# Patient Record
Sex: FEMALE | Race: WHITE | RURAL
[De-identification: names, ages, dates, MRNs, and addresses within clinical notes are randomized per-mention and may not be internally consistent; named-entity substitution may affect disease eponyms.]

---

## 2017-07-05 ENCOUNTER — ON CAMPUS - OUTPATIENT (AMBULATORY)
Dept: RURAL HOSPITAL 3 | Facility: HOSPITAL | Age: 67
End: 2017-07-05
Payer: MEDICARE

## 2017-07-05 DIAGNOSIS — Z12.11 ENCOUNTER FOR SCREENING FOR MALIGNANT NEOPLASM OF COLON: ICD-10-CM

## 2017-07-05 DIAGNOSIS — K64.8 OTHER HEMORRHOIDS: ICD-10-CM

## 2017-07-05 PROCEDURE — G0121 COLON CA SCRN NOT HI RSK IND: HCPCS | Performed by: INTERNAL MEDICINE

## 2018-07-24 ENCOUNTER — OFFICE (AMBULATORY)
Dept: URBAN - METROPOLITAN AREA CLINIC 64 | Facility: CLINIC | Age: 68
End: 2018-07-24

## 2018-07-24 VITALS
DIASTOLIC BLOOD PRESSURE: 84 MMHG | WEIGHT: 172 LBS | HEART RATE: 84 BPM | HEIGHT: 64 IN | SYSTOLIC BLOOD PRESSURE: 118 MMHG

## 2018-07-24 DIAGNOSIS — K62.89 OTHER SPECIFIED DISEASES OF ANUS AND RECTUM: ICD-10-CM

## 2018-07-24 DIAGNOSIS — K64.4 RESIDUAL HEMORRHOIDAL SKIN TAGS: ICD-10-CM

## 2018-07-24 DIAGNOSIS — K59.00 CONSTIPATION, UNSPECIFIED: ICD-10-CM

## 2018-07-24 DIAGNOSIS — K21.9 GASTRO-ESOPHAGEAL REFLUX DISEASE WITHOUT ESOPHAGITIS: ICD-10-CM

## 2018-07-24 DIAGNOSIS — R15.2 FECAL URGENCY: ICD-10-CM

## 2018-07-24 DIAGNOSIS — R19.5 OTHER FECAL ABNORMALITIES: ICD-10-CM

## 2018-07-24 DIAGNOSIS — K62.5 HEMORRHAGE OF ANUS AND RECTUM: ICD-10-CM

## 2018-07-24 DIAGNOSIS — R10.30 LOWER ABDOMINAL PAIN, UNSPECIFIED: ICD-10-CM

## 2018-07-24 PROCEDURE — 99214 OFFICE O/P EST MOD 30 MIN: CPT | Performed by: NURSE PRACTITIONER

## 2018-07-24 RX ORDER — DICYCLOMINE HYDROCHLORIDE 20 MG/1
40 TABLET ORAL
Qty: 60 | Refills: 11 | Status: ACTIVE
Start: 2018-07-24

## 2018-07-24 RX ORDER — HYDROCORTISONE 25 MG/G
OINTMENT TOPICAL
Qty: 30 | Refills: 6 | Status: ACTIVE
Start: 2018-07-24

## 2018-07-24 RX ORDER — SORBITOL SOLUTION 70 %
SOLUTION, ORAL MISCELLANEOUS
Qty: 100 | Refills: 0 | Status: ACTIVE
Start: 2018-07-24

## 2018-07-24 RX ORDER — LIDOCAINE 50 MG/G
CREAM TOPICAL
Qty: 30 | Refills: 1 | Status: ACTIVE
Start: 2018-07-24

## 2019-11-05 ENCOUNTER — IMPORTED ENCOUNTER (OUTPATIENT)
Dept: URBAN - METROPOLITAN AREA CLINIC 50 | Facility: CLINIC | Age: 69
End: 2019-11-05

## 2020-04-10 ENCOUNTER — HOSPITAL ENCOUNTER (OUTPATIENT)
Facility: HOSPITAL | Age: 70
Setting detail: OBSERVATION
Discharge: HOME | End: 2020-04-11
Attending: EMERGENCY MEDICINE | Admitting: HOSPITALIST

## 2020-04-10 ENCOUNTER — APPOINTMENT (OUTPATIENT)
Dept: CT IMAGING | Facility: HOSPITAL | Age: 70
End: 2020-04-10

## 2020-04-10 DIAGNOSIS — R41.0 CONFUSION: Primary | ICD-10-CM

## 2020-04-10 PROBLEM — E66.9 OBESITY (BMI 30-39.9): Status: ACTIVE | Noted: 2020-04-10

## 2020-04-10 PROBLEM — N17.9 AKI (ACUTE KIDNEY INJURY) (HCC): Status: ACTIVE | Noted: 2020-04-10

## 2020-04-10 PROBLEM — F41.9 ANXIETY DISORDER: Status: ACTIVE | Noted: 2020-04-10

## 2020-04-10 PROBLEM — E87.6 HYPOKALEMIA: Status: ACTIVE | Noted: 2020-04-10

## 2020-04-10 LAB
ANION GAP SERPL CALCULATED.3IONS-SCNC: 14 MMOL/L (ref 5–15)
APTT PPP: 22.7 SECONDS (ref 24–31)
BACTERIA UR QL AUTO: ABNORMAL /HPF
BASOPHILS # BLD AUTO: 0 10*3/MM3 (ref 0–0.2)
BASOPHILS NFR BLD AUTO: 0.7 % (ref 0–1.5)
BILIRUB UR QL STRIP: NEGATIVE
BUN BLD-MCNC: 17 MG/DL (ref 8–23)
BUN/CREAT SERPL: 13.4 (ref 7–25)
CALCIUM SPEC-SCNC: 9.8 MG/DL (ref 8.6–10.5)
CHLORIDE SERPL-SCNC: 96 MMOL/L (ref 98–107)
CLARITY UR: CLEAR
CO2 SERPL-SCNC: 29 MMOL/L (ref 22–29)
COLOR UR: YELLOW
CREAT BLD-MCNC: 1.27 MG/DL (ref 0.57–1)
DEPRECATED RDW RBC AUTO: 42.4 FL (ref 37–54)
EOSINOPHIL # BLD AUTO: 0.1 10*3/MM3 (ref 0–0.4)
EOSINOPHIL NFR BLD AUTO: 2.1 % (ref 0.3–6.2)
ERYTHROCYTE [DISTWIDTH] IN BLOOD BY AUTOMATED COUNT: 14 % (ref 12.3–15.4)
GFR SERPL CREATININE-BSD FRML MDRD: 42 ML/MIN/1.73
GLUCOSE BLD-MCNC: 116 MG/DL (ref 65–99)
GLUCOSE UR STRIP-MCNC: NEGATIVE MG/DL
HCT VFR BLD AUTO: 41.2 % (ref 34–46.6)
HGB BLD-MCNC: 14.8 G/DL (ref 12–15.9)
HGB UR QL STRIP.AUTO: NEGATIVE
HOLD SPECIMEN: NORMAL
HOLD SPECIMEN: NORMAL
HYALINE CASTS UR QL AUTO: ABNORMAL /LPF
INR PPP: 0.95 (ref 0.9–1.1)
KETONES UR QL STRIP: NEGATIVE
LEUKOCYTE ESTERASE UR QL STRIP.AUTO: ABNORMAL
LYMPHOCYTES # BLD AUTO: 1.6 10*3/MM3 (ref 0.7–3.1)
LYMPHOCYTES NFR BLD AUTO: 22.2 % (ref 19.6–45.3)
MCH RBC QN AUTO: 31.3 PG (ref 26.6–33)
MCHC RBC AUTO-ENTMCNC: 35.9 G/DL (ref 31.5–35.7)
MCV RBC AUTO: 87.1 FL (ref 79–97)
MONOCYTES # BLD AUTO: 0.7 10*3/MM3 (ref 0.1–0.9)
MONOCYTES NFR BLD AUTO: 10.5 % (ref 5–12)
NEUTROPHILS # BLD AUTO: 4.5 10*3/MM3 (ref 1.7–7)
NEUTROPHILS NFR BLD AUTO: 64.5 % (ref 42.7–76)
NITRITE UR QL STRIP: NEGATIVE
NRBC BLD AUTO-RTO: 0.1 /100 WBC (ref 0–0.2)
PH UR STRIP.AUTO: 6 [PH] (ref 5–8)
PLATELET # BLD AUTO: 207 10*3/MM3 (ref 140–450)
PMV BLD AUTO: 10.6 FL (ref 6–12)
POTASSIUM BLD-SCNC: 3 MMOL/L (ref 3.5–5.2)
POTASSIUM BLD-SCNC: 3.1 MMOL/L (ref 3.5–5.2)
PROT UR QL STRIP: NEGATIVE
PROTHROMBIN TIME: 10 SECONDS (ref 9.6–11.7)
RBC # BLD AUTO: 4.73 10*6/MM3 (ref 3.77–5.28)
RBC # UR: ABNORMAL /HPF
REF LAB TEST METHOD: ABNORMAL
SODIUM BLD-SCNC: 139 MMOL/L (ref 136–145)
SP GR UR STRIP: 1.01 (ref 1–1.03)
SQUAMOUS #/AREA URNS HPF: ABNORMAL /HPF
TROPONIN T SERPL-MCNC: <0.01 NG/ML (ref 0–0.03)
UROBILINOGEN UR QL STRIP: ABNORMAL
WBC NRBC COR # BLD: 7 10*3/MM3 (ref 3.4–10.8)
WBC UR QL AUTO: ABNORMAL /HPF
WHOLE BLOOD HOLD SPECIMEN: NORMAL
WHOLE BLOOD HOLD SPECIMEN: NORMAL

## 2020-04-10 PROCEDURE — 99219 PR INITIAL OBSERVATION CARE/DAY 50 MINUTES: CPT | Performed by: PHYSICIAN ASSISTANT

## 2020-04-10 PROCEDURE — 70450 CT HEAD/BRAIN W/O DYE: CPT

## 2020-04-10 PROCEDURE — G0378 HOSPITAL OBSERVATION PER HR: HCPCS

## 2020-04-10 PROCEDURE — 96372 THER/PROPH/DIAG INJ SC/IM: CPT

## 2020-04-10 PROCEDURE — 85610 PROTHROMBIN TIME: CPT | Performed by: EMERGENCY MEDICINE

## 2020-04-10 PROCEDURE — 85025 COMPLETE CBC W/AUTO DIFF WBC: CPT | Performed by: EMERGENCY MEDICINE

## 2020-04-10 PROCEDURE — 84132 ASSAY OF SERUM POTASSIUM: CPT | Performed by: PHYSICIAN ASSISTANT

## 2020-04-10 PROCEDURE — 25010000002 ENOXAPARIN PER 10 MG: Performed by: PHYSICIAN ASSISTANT

## 2020-04-10 PROCEDURE — 81001 URINALYSIS AUTO W/SCOPE: CPT | Performed by: EMERGENCY MEDICINE

## 2020-04-10 PROCEDURE — 84484 ASSAY OF TROPONIN QUANT: CPT | Performed by: EMERGENCY MEDICINE

## 2020-04-10 PROCEDURE — 85730 THROMBOPLASTIN TIME PARTIAL: CPT | Performed by: EMERGENCY MEDICINE

## 2020-04-10 PROCEDURE — 99285 EMERGENCY DEPT VISIT HI MDM: CPT

## 2020-04-10 PROCEDURE — 80048 BASIC METABOLIC PNL TOTAL CA: CPT | Performed by: EMERGENCY MEDICINE

## 2020-04-10 PROCEDURE — 93005 ELECTROCARDIOGRAM TRACING: CPT | Performed by: EMERGENCY MEDICINE

## 2020-04-10 RX ORDER — FAMOTIDINE 20 MG/1
20 TABLET, FILM COATED ORAL EVERY MORNING
COMMUNITY
End: 2022-06-04

## 2020-04-10 RX ORDER — ACETAMINOPHEN 325 MG/1
650 TABLET ORAL EVERY 4 HOURS PRN
Status: DISCONTINUED | OUTPATIENT
Start: 2020-04-10 | End: 2020-04-11 | Stop reason: HOSPADM

## 2020-04-10 RX ORDER — ALUMINA, MAGNESIA, AND SIMETHICONE 2400; 2400; 240 MG/30ML; MG/30ML; MG/30ML
15 SUSPENSION ORAL EVERY 6 HOURS PRN
Status: DISCONTINUED | OUTPATIENT
Start: 2020-04-10 | End: 2020-04-11 | Stop reason: HOSPADM

## 2020-04-10 RX ORDER — SODIUM CHLORIDE 0.9 % (FLUSH) 0.9 %
10 SYRINGE (ML) INJECTION AS NEEDED
Status: DISCONTINUED | OUTPATIENT
Start: 2020-04-10 | End: 2020-04-11 | Stop reason: HOSPADM

## 2020-04-10 RX ORDER — CHOLECALCIFEROL (VITAMIN D3) 125 MCG
5 CAPSULE ORAL NIGHTLY PRN
Status: DISCONTINUED | OUTPATIENT
Start: 2020-04-10 | End: 2020-04-11 | Stop reason: HOSPADM

## 2020-04-10 RX ORDER — ASPIRIN 300 MG/1
300 SUPPOSITORY RECTAL DAILY
Status: DISCONTINUED | OUTPATIENT
Start: 2020-04-11 | End: 2020-04-11 | Stop reason: HOSPADM

## 2020-04-10 RX ORDER — MAGNESIUM SULFATE HEPTAHYDRATE 40 MG/ML
2 INJECTION, SOLUTION INTRAVENOUS AS NEEDED
Status: DISCONTINUED | OUTPATIENT
Start: 2020-04-10 | End: 2020-04-11 | Stop reason: HOSPADM

## 2020-04-10 RX ORDER — AMLODIPINE BESYLATE 5 MG/1
10 TABLET ORAL DAILY
Status: DISCONTINUED | OUTPATIENT
Start: 2020-04-11 | End: 2020-04-11 | Stop reason: HOSPADM

## 2020-04-10 RX ORDER — CALCIUM GLUCONATE 20 MG/ML
1 INJECTION, SOLUTION INTRAVENOUS AS NEEDED
Status: DISCONTINUED | OUTPATIENT
Start: 2020-04-10 | End: 2020-04-11 | Stop reason: HOSPADM

## 2020-04-10 RX ORDER — VENLAFAXINE HYDROCHLORIDE 75 MG/1
75 CAPSULE, EXTENDED RELEASE ORAL DAILY
Status: DISCONTINUED | OUTPATIENT
Start: 2020-04-11 | End: 2020-04-11 | Stop reason: HOSPADM

## 2020-04-10 RX ORDER — CALCIUM GLUCONATE 20 MG/ML
2 INJECTION, SOLUTION INTRAVENOUS AS NEEDED
Status: DISCONTINUED | OUTPATIENT
Start: 2020-04-10 | End: 2020-04-11 | Stop reason: HOSPADM

## 2020-04-10 RX ORDER — CALCIUM POLYCARBOPHIL 625 MG
625 TABLET ORAL DAILY
Status: DISCONTINUED | OUTPATIENT
Start: 2020-04-11 | End: 2020-04-11 | Stop reason: HOSPADM

## 2020-04-10 RX ORDER — VENLAFAXINE HYDROCHLORIDE 75 MG/1
75 CAPSULE, EXTENDED RELEASE ORAL EVERY MORNING
COMMUNITY
Start: 2016-11-01

## 2020-04-10 RX ORDER — DICYCLOMINE HYDROCHLORIDE 10 MG/1
10 CAPSULE ORAL DAILY
Status: DISCONTINUED | OUTPATIENT
Start: 2020-04-11 | End: 2020-04-11 | Stop reason: HOSPADM

## 2020-04-10 RX ORDER — POTASSIUM CHLORIDE 20 MEQ/1
40 TABLET, EXTENDED RELEASE ORAL DAILY
Status: DISCONTINUED | OUTPATIENT
Start: 2020-04-10 | End: 2020-04-11 | Stop reason: HOSPADM

## 2020-04-10 RX ORDER — DICYCLOMINE HYDROCHLORIDE 10 MG/1
10 CAPSULE ORAL EVERY MORNING
COMMUNITY
End: 2022-06-04

## 2020-04-10 RX ORDER — CALCIUM POLYCARBOPHIL 625 MG 625 MG/1
625 TABLET ORAL DAILY
COMMUNITY

## 2020-04-10 RX ORDER — MAGNESIUM SULFATE HEPTAHYDRATE 40 MG/ML
4 INJECTION, SOLUTION INTRAVENOUS AS NEEDED
Status: DISCONTINUED | OUTPATIENT
Start: 2020-04-10 | End: 2020-04-11 | Stop reason: HOSPADM

## 2020-04-10 RX ORDER — ONDANSETRON 4 MG/1
4 TABLET, FILM COATED ORAL EVERY 6 HOURS PRN
Status: DISCONTINUED | OUTPATIENT
Start: 2020-04-10 | End: 2020-04-11 | Stop reason: HOSPADM

## 2020-04-10 RX ORDER — ATORVASTATIN CALCIUM 20 MG/1
20 TABLET, FILM COATED ORAL DAILY
Status: DISCONTINUED | OUTPATIENT
Start: 2020-04-11 | End: 2020-04-11

## 2020-04-10 RX ORDER — OMEGA-3S/DHA/EPA/FISH OIL/D3 300MG-1000
400 CAPSULE ORAL DAILY
COMMUNITY

## 2020-04-10 RX ORDER — ASPIRIN 81 MG/1
81 TABLET, CHEWABLE ORAL DAILY
Status: DISCONTINUED | OUTPATIENT
Start: 2020-04-11 | End: 2020-04-11 | Stop reason: HOSPADM

## 2020-04-10 RX ORDER — POTASSIUM CHLORIDE 1.5 G/1.77G
40 POWDER, FOR SOLUTION ORAL AS NEEDED
Status: DISCONTINUED | OUTPATIENT
Start: 2020-04-10 | End: 2020-04-11 | Stop reason: HOSPADM

## 2020-04-10 RX ORDER — SIMVASTATIN 40 MG
40 TABLET ORAL EVERY MORNING
COMMUNITY
Start: 2017-10-18 | End: 2022-06-04

## 2020-04-10 RX ORDER — MELOXICAM 7.5 MG/1
TABLET ORAL
COMMUNITY
Start: 2016-11-01 | End: 2020-04-10

## 2020-04-10 RX ORDER — HYDRALAZINE HYDROCHLORIDE 20 MG/ML
10 INJECTION INTRAMUSCULAR; INTRAVENOUS EVERY 6 HOURS PRN
Status: DISCONTINUED | OUTPATIENT
Start: 2020-04-10 | End: 2020-04-11 | Stop reason: HOSPADM

## 2020-04-10 RX ORDER — TRIAMTERENE AND HYDROCHLOROTHIAZIDE 37.5; 25 MG/1; MG/1
1 TABLET ORAL DAILY
COMMUNITY
Start: 2018-10-30 | End: 2022-06-04

## 2020-04-10 RX ORDER — SODIUM CHLORIDE 0.9 % (FLUSH) 0.9 %
10 SYRINGE (ML) INJECTION EVERY 12 HOURS SCHEDULED
Status: DISCONTINUED | OUTPATIENT
Start: 2020-04-10 | End: 2020-04-11 | Stop reason: HOSPADM

## 2020-04-10 RX ORDER — AMLODIPINE BESYLATE 10 MG/1
TABLET ORAL
COMMUNITY
Start: 2016-11-01

## 2020-04-10 RX ORDER — DOCUSATE SODIUM 100 MG/1
100 CAPSULE, LIQUID FILLED ORAL 2 TIMES DAILY PRN
Status: DISCONTINUED | OUTPATIENT
Start: 2020-04-10 | End: 2020-04-11 | Stop reason: HOSPADM

## 2020-04-10 RX ORDER — POTASSIUM CHLORIDE 20 MEQ/1
40 TABLET, EXTENDED RELEASE ORAL AS NEEDED
Status: DISCONTINUED | OUTPATIENT
Start: 2020-04-10 | End: 2020-04-11 | Stop reason: HOSPADM

## 2020-04-10 RX ORDER — NITROGLYCERIN 0.4 MG/1
0.4 TABLET SUBLINGUAL
Status: DISCONTINUED | OUTPATIENT
Start: 2020-04-10 | End: 2020-04-11 | Stop reason: HOSPADM

## 2020-04-10 RX ORDER — TRAMADOL HYDROCHLORIDE 50 MG/1
TABLET ORAL
COMMUNITY
Start: 2016-11-01 | End: 2020-04-10

## 2020-04-10 RX ORDER — FAMOTIDINE 20 MG/1
20 TABLET, FILM COATED ORAL
Status: DISCONTINUED | OUTPATIENT
Start: 2020-04-11 | End: 2020-04-11 | Stop reason: HOSPADM

## 2020-04-10 RX ORDER — ACETAMINOPHEN 160 MG/5ML
650 SOLUTION ORAL EVERY 4 HOURS PRN
Status: DISCONTINUED | OUTPATIENT
Start: 2020-04-10 | End: 2020-04-11 | Stop reason: HOSPADM

## 2020-04-10 RX ORDER — ONDANSETRON 2 MG/ML
4 INJECTION INTRAMUSCULAR; INTRAVENOUS EVERY 6 HOURS PRN
Status: DISCONTINUED | OUTPATIENT
Start: 2020-04-10 | End: 2020-04-11 | Stop reason: HOSPADM

## 2020-04-10 RX ORDER — MELATONIN
500 DAILY
Status: DISCONTINUED | OUTPATIENT
Start: 2020-04-11 | End: 2020-04-11 | Stop reason: HOSPADM

## 2020-04-10 RX ORDER — ACETAMINOPHEN 650 MG/1
650 SUPPOSITORY RECTAL EVERY 4 HOURS PRN
Status: DISCONTINUED | OUTPATIENT
Start: 2020-04-10 | End: 2020-04-11 | Stop reason: HOSPADM

## 2020-04-10 RX ADMIN — ENOXAPARIN SODIUM 40 MG: 40 INJECTION SUBCUTANEOUS at 21:41

## 2020-04-10 RX ADMIN — POTASSIUM CHLORIDE 40 MEQ: 1500 TABLET, EXTENDED RELEASE ORAL at 17:55

## 2020-04-10 RX ADMIN — METOPROLOL TARTRATE 25 MG: 25 TABLET, FILM COATED ORAL at 21:41

## 2020-04-10 NOTE — H&P
"      [facility] Medicine Services      Patient Name: Christen Shell  : 1950  MRN: [id number]  Primary Care Physician: Lake Mchugh IV, MD  Date of admission: 4/10/2020    Patient Care Team:  Lake Mchugh IV, MD as PCP - General (Internal Medicine)          Subjective   History Present Illness     Chief Complaint:   Chief Complaint   Patient presents with   • Syncope       Ms. Shell is a 69 y.o. female with past medical history of anxiety, GERD, hypertension, hyperlipidemia and obesity who presents to [facility] complaining of an acute episode of confusion.  Patient states she recalls a phone conversation with her daughter around 1400 hrs. in which she was experiencing no symptoms.  She states she has no recollection of events after that point for the next 2 hours while her  was outside mowing the lawn.  Her  noted that she walked onto the deck and seemed confused with a \"blank stare\" and asked him to come inside.  He contacted her daughter due to patient's unusual behavior who found patient standing in closet and reports the patient was discussing things about the closet but did not seem to be completely oriented or interacting appropriately.  Family called 911 and patient's daughter assisted patient to get ready removed her jewelry and earrings which patient does not recall.  She states the first thing she remembers after all of these events was EMS arriving and telling her that she needed to be seen at the hospital.  With the symptoms she denies any focal neurologic deficits, no falls, trauma, pain, palpitations or irregular heartbeat are reported.  She denies any fever, shortness of air, cough, nausea, vomiting or diarrhea and reports no known sick contacts.  Patient does note one episode of left-sided paresthesias described as \"pins-and-needles in my left arm and leg\" as well as a headache which lasted approximately 10 minutes 3 months prior and resolve " spontaneously.  Patient did not seek medical attention at the time of this event.    In the ED patient found to have labs significant for potassium: 3.1, creatinine: 1.27, remainder of CBC and BMP generally unremarkable.  Troponin less than 0.010.  Sterile pyuria noted on UA with 3-5 WBCs, 6-12 RBCs and 1+ leukocyte esterase and no bacteria or squamous epithelial cells reported.  CT of head reported as showing no acute intracranial abnormality specifically no evidence of hemorrhage, mass-effect or midline shift.  EKG shows sinus rhythm at 85 without acute ST changes or ectopy and a QTC of 471 ms.         History of Present Illness    Review of Systems   Constitution: Negative.   HENT: Negative.    Eyes: Negative.    Cardiovascular: Negative.    Respiratory: Negative.    Endocrine: Negative.    Hematologic/Lymphatic: Negative.    Skin: Negative.    Musculoskeletal: Negative.    Gastrointestinal: Negative.    Genitourinary: Negative.    Neurological: Positive for dizziness and paresthesias.        1 episode of Dizziness and L sided parasthesias in Jan lasting approx 10 min resolving spontaneousl   Psychiatric/Behavioral: Positive for altered mental status.   Allergic/Immunologic: Negative.            Personal History     Past Medical History:   Past Medical History:   Diagnosis Date   • GERD (gastroesophageal reflux disease)    • Hyperlipidemia    • Hypertension        Surgical History:      Past Surgical History:   Procedure Laterality Date   • HYSTERECTOMY             Family History: family history includes Coronary artery disease in her maternal grandfather; Lung cancer in her mother; Pancreatic cancer in her father. Otherwise pertinent FHx was reviewed and unremarkable.     Social History:  reports that she has never smoked. She has never used smokeless tobacco. She reports that she does not use drugs.      Medications:  Prior to Admission medications    Medication Sig Start Date End Date Taking? Authorizing  Provider   amLODIPine (NORVASC) 10 MG tablet TAKE 1 TABLET BY MOUTH  DAILY 11/1/16  Yes Kamari Werner MD   calcium polycarbophil (FIBERCON) 625 MG tablet Take 625 mg by mouth Daily.   Yes ProviderKamari MD   cholecalciferol (VITAMIN D3) 10 MCG (400 UNIT) tablet Take 400 Units by mouth Daily.   Yes Kamari Werner MD   dicyclomine (BENTYL) 10 MG capsule Take 10 mg by mouth Every Morning.   Yes ProviderKamari MD   famotidine (PEPCID) 20 MG tablet Take 20 mg by mouth Every Morning.   Yes Provider, MD Kamari   metoprolol tartrate (LOPRESSOR) 25 MG tablet TAKE ONE TABLET BY MOUTH TWICE DAILY 8/29/18  Yes Kamari Werner MD   simvastatin (ZOCOR) 40 MG tablet Take 40 mg by mouth Every Morning. 10/18/17  Yes Kamari Werner MD   triamterene-hydrochlorothiazide (MAXZIDE-25) 37.5-25 MG per tablet Take 1 tablet by mouth Daily. 10/30/18  Yes Kamari Werner MD   venlafaxine XR (EFFEXOR-XR) 75 MG 24 hr capsule Take 75 mg by mouth Every Morning. 11/1/16  Yes Kamari Werner MD   meloxicam (MOBIC) 7.5 MG tablet MELOXICAM 7.5 MG TABS 11/1/16 4/10/20 Yes Kamari Werner MD   traMADol (ULTRAM) 50 MG tablet TRAMADOL HCL 50 MG TABS 11/1/16 4/10/20 Yes ProviderKamari MD       Allergies:  No Known Allergies    Objective   Objective     Vital Signs  Temp:  [98 °F (36.7 °C)-98.6 °F (37 °C)] 98.6 °F (37 °C)  Heart Rate:  [70-88] 70  Resp:  [15-21] 21  BP: (104-145)/(82-94) 145/93  SpO2:  [96 %-98 %] 96 %  on   ;   Device (Oxygen Therapy): room air  Body mass index is 30.05 kg/m².    Physical Exam   Constitutional: She is oriented to person, place, and time. She appears well-developed and well-nourished. No distress.   HENT:   Head: Normocephalic and atraumatic.   Right Ear: External ear normal.   Left Ear: External ear normal.   Nose: Nose normal.   Eyes: Conjunctivae and EOM are normal.   Neck: Normal range of motion. No JVD present.   Cardiovascular: Normal rate,  regular rhythm, normal heart sounds and intact distal pulses.   No bruits appreciated on exam   Pulmonary/Chest: Effort normal and breath sounds normal.   Abdominal: Soft. Bowel sounds are normal.   Musculoskeletal: Normal range of motion.   Neurological: She is alert and oriented to person, place, and time.   Skin: Skin is warm and dry.   Psychiatric: She has a normal mood and affect. Her behavior is normal. Judgment and thought content normal.   Vitals reviewed.      Results Review:  I have personally reviewed most recent cardiac tracings, lab results and radiology images and interpretations and agree with findings, most notably: Potassium, creatinine, CT of head and EKG.    Results from last 7 days   Lab Units 04/10/20  1622   WBC 10*3/mm3 7.00   HEMOGLOBIN g/dL 14.8   HEMATOCRIT % 41.2   PLATELETS 10*3/mm3 207   INR  0.95     Results from last 7 days   Lab Units 04/10/20  1622   SODIUM mmol/L 139   POTASSIUM mmol/L 3.1*   CHLORIDE mmol/L 96*   CO2 mmol/L 29.0   BUN mg/dL 17   CREATININE mg/dL 1.27*   GLUCOSE mg/dL 116*   CALCIUM mg/dL 9.8   TROPONIN T ng/mL <0.010     Estimated Creatinine Clearance: 43.8 mL/min (A) (by C-G formula based on SCr of 1.27 mg/dL (H)).  Brief Urine Lab Results  (Last result in the past 365 days)      Color   Clarity   Blood   Leuk Est   Nitrite   Protein   CREAT   Urine HCG        04/10/20 1638 Yellow Clear Negative Small (1+) Negative Negative               Microbiology Results (last 10 days)     ** No results found for the last 240 hours. **          ECG/EMG Results (most recent)     Procedure Component Value Units Date/Time    ECG 12 Lead [[id number]] Collected:  04/10/20 1616     Updated:  04/10/20 1618    Narrative:       HEART RATE= 85  bpm  RR Interval= 708  ms  KY Interval= 161  ms  P Horizontal Axis= -24  deg  P Front Axis= 41  deg  QRSD Interval= 99  ms  QT Interval= 396  ms  QRS Axis= -25  deg  T Wave Axis= 49  deg  - ABNORMAL ECG -  Sinus rhythm  Inferior infarct, old  No  previous ECG available for comparison  Electronically Signed By:   Date and Time of Study: 2020-04-10 16:16:31                    Ct Head Without Contrast    Result Date: 4/10/2020  No acute intracranial abnormality. Specifically, no evidence of hemorrhage, mass effect or midline shift  Electronically Signed By-Wilber Walker On:4/10/2020 5:28 PM This report was finalized on [id number] by  Wilber Walker, .        Estimated Creatinine Clearance: 43.8 mL/min (A) (by C-G formula based on SCr of 1.27 mg/dL (H)).    Assessment/Plan   Assessment/Plan       Active Hospital Problems    Diagnosis  POA   • **Confusion [R41.0]  Yes     Priority: High   • MICKY (acute kidney injury) (CMS/HCC) [N17.9]  Yes     Priority: High   • Hypokalemia [E87.6]  Yes     Priority: Medium   • Hypertension [I10]  Yes     Priority: Medium   • Anxiety disorder [F41.9]  Unknown     Priority: Low   • Obesity (BMI 30-39.9) [E66.9]  Yes     Priority: Low   • GERD (gastroesophageal reflux disease) [K21.9]  Yes     Priority: Low   • Hyperlipidemia [E78.5]  Yes     Priority: Low      Resolved Hospital Problems   No resolved problems to display.     1.  Confusion  -Last known normal approximately 1400 hrs. on 4/10/2020 with 2-hour period of confusion and dissociative behavior noted which had spontaneous resolution prior to presentation of the ED  -CT of head reported as showing no acute intracranial abnormality specifically no evidence of hemorrhage, mass-effect or midline shift.    -EKG shows sinus rhythm at 85 without acute ST changes or ectopy and a QTC of 471 ms.  -MRI of brain in a.m.  -Neuro checks and NIH per protocol  -Continue statin start aspirin 81 mg  -Check TSH, B12, lipid panel and A1c  -Neurology consulted    2.  MICKY  -Patient presents with a creatinine 1.27, BUN: 17, BUN/creatinine ratio 13.4 and EGFR 42 (creatinine normal in 2018)  -Patient states she believes she has been told she has mild chronic kidney disease by her physician in  [place] however no record or previous lab work since 2018 available at this time  -Diuretic held (which patient takes for mild peripheral edema)  -Monitor BMP    3.  Hypokalemia  -Potassium: 3.1 in the ED  -Replacement protocol ordered  -Monitor BMP while admitted    4.  Hypertension  -Moderately controlled with a blood pressure on admission of 141/92  - Continue amlodipine and metoprolol  -PRN hydralazine  - Monitor while admittied    5.  Anxiety  -Symptoms at baseline, continue venlafaxine    6.  Obesity (BMI: 30.05)  -Encourage diet lifestyle modifications once acute symptoms resolve    7.  GERD  -Continue famotidine    8.  Hyperlipidemia  -Continue statin            VTE Prophylaxis -Lovenox  Mechanical Order History:     None      Pharmalogical Order History:     None          CODE STATUS: Full  Code Status and Medical Interventions:   Ordered at: 04/10/20 1939     Level Of Support Discussed With:    Patient     Code Status:    CPR     Medical Interventions (Level of Support Prior to Arrest):    Full         I discussed the patient's findings and my recommendations with patient.        Electronically signed by Charles Rosa PA-C, 04/10/20, 7:27 PM.  [facility] Hospitalist Team

## 2020-04-10 NOTE — ED NOTES
Pt reports that she remembers eating lunch with her  but doesn't remember going out to the porch to flag him down or why she needed him     Kimberly Carvajal, TAYLOR  04/10/20 3752

## 2020-04-10 NOTE — ED PROVIDER NOTES
Subjective   Chief complaint: Confusion    69-year-old female presents with an episode of confusion.  Patient states there is a portion of her day that she cannot recall.  She states she remembers getting up this morning and she did a lot of cooking this morning.  She remembers eating lunch.  She does not recall anything after this point.  Apparently her  was outside mowing the grass and she came out there and hollered for him.  He apparently stated that she was confused and did not know which house she was in.  Patient states they have a house here as well as in [place] and she was told that she was acting like she did not know which one she was in.  She states she feels fine now.  She denies any headache, numbness, weakness, tingling.  She has had no chest pain or shortness of breath.  She denies any recent illness.  Patient states her  was concerned that she may have fallen and hit her head but there was no evidence of this.  Patient does not remember falling.  She is not having any pain anywhere.  She states she has never had an episode like this before.      History provided by:  Patient      Review of Systems   Constitutional: Negative for fatigue and fever.   HENT: Negative for congestion and sore throat.    Eyes: Negative for pain and redness.   Respiratory: Negative for cough and shortness of breath.    Cardiovascular: Negative for chest pain and palpitations.   Gastrointestinal: Negative for abdominal pain, diarrhea and vomiting.   Genitourinary: Negative for dysuria and frequency.   Musculoskeletal: Negative for back pain.   Skin: Negative for rash.   Neurological: Negative for dizziness and headaches.   Psychiatric/Behavioral: Positive for confusion. Negative for behavioral problems.       Past Medical History:   Diagnosis Date   • Hyperlipidemia    • Hypertension        No Known Allergies    History reviewed. No pertinent surgical history.    History reviewed. No pertinent family  "history.    Social History     Socioeconomic History   • Marital status:      Spouse name: Not on file   • Number of children: Not on file   • Years of education: Not on file   • Highest education level: Not on file       /94   Pulse 70   Temp 98.1 °F (36.7 °C) (Oral)   Resp 16   Ht 162.6 cm (64\")   Wt 77.1 kg (170 lb)   SpO2 98%   BMI 29.18 kg/m²       Objective   Physical Exam   Constitutional: She is oriented to person, place, and time. She appears well-developed and well-nourished.   HENT:   Head: Normocephalic and atraumatic.   Eyes: Pupils are equal, round, and reactive to light. EOM are normal.   Neck: Normal range of motion. Neck supple.   Cardiovascular: Normal rate, regular rhythm and normal heart sounds.   Pulmonary/Chest: Effort normal and breath sounds normal. No respiratory distress.   Abdominal: Soft. Bowel sounds are normal. There is no tenderness.   Musculoskeletal: Normal range of motion.   Neurological: She is alert and oriented to person, place, and time.   No focal motor or sensory deficit appreciated   Skin: Skin is warm and dry.   Nursing note and vitals reviewed.      Procedures           ED Course      My interpretation of EKG shows sinus rhythm, rate of 85, no ST elevation.     Results for orders placed or performed during the hospital encounter of 04/10/20   Basic Metabolic Panel   Result Value Ref Range    Glucose 116 (H) 65 - 99 mg/dL    BUN 17 8 - 23 mg/dL    Creatinine 1.27 (H) 0.57 - 1.00 mg/dL    Sodium 139 136 - 145 mmol/L    Potassium 3.1 (L) 3.5 - 5.2 mmol/L    Chloride 96 (L) 98 - 107 mmol/L    CO2 29.0 22.0 - 29.0 mmol/L    Calcium 9.8 8.6 - 10.5 mg/dL    eGFR Non African Amer 42 (L) >60 mL/min/1.73    BUN/Creatinine Ratio 13.4 7.0 - 25.0    Anion Gap 14.0 5.0 - 15.0 mmol/L   Protime-INR   Result Value Ref Range    Protime 10.0 9.6 - 11.7 Seconds    INR 0.95 0.90 - 1.10   aPTT   Result Value Ref Range    PTT 22.7 (L) 24.0 - 31.0 seconds   Urinalysis With " Culture If Indicated - Urine, Clean Catch   Result Value Ref Range    Color, UA Yellow Yellow, Straw    Appearance, UA Clear Clear    pH, UA 6.0 5.0 - 8.0    Specific Gravity, UA 1.007 1.005 - 1.030    Glucose, UA Negative Negative    Ketones, UA Negative Negative    Bilirubin, UA Negative Negative    Blood, UA Negative Negative    Protein, UA Negative Negative    Leuk Esterase, UA Small (1+) (A) Negative    Nitrite, UA Negative Negative    Urobilinogen, UA 0.2 E.U./dL 0.2 - 1.0 E.U./dL   Troponin   Result Value Ref Range    Troponin T <0.010 0.000 - 0.030 ng/mL   Urinalysis, Microscopic Only - Urine, Clean Catch   Result Value Ref Range    RBC, UA 6-12 (A) None Seen /HPF    WBC, UA 3-5 (A) None Seen /HPF    Bacteria, UA None Seen None Seen /HPF    Squamous Epithelial Cells, UA 0-2 None Seen, 0-2 /HPF    Hyaline Casts, UA 0-2 None Seen /LPF    Methodology Automated Microscopy    CBC Auto Differential   Result Value Ref Range    WBC 7.00 3.40 - 10.80 10*3/mm3    RBC 4.73 3.77 - 5.28 10*6/mm3    Hemoglobin 14.8 12.0 - 15.9 g/dL    Hematocrit 41.2 34.0 - 46.6 %    MCV 87.1 79.0 - 97.0 fL    MCH 31.3 26.6 - 33.0 pg    MCHC 35.9 (H) 31.5 - 35.7 g/dL    RDW 14.0 12.3 - 15.4 %    RDW-SD 42.4 37.0 - 54.0 fl    MPV 10.6 6.0 - 12.0 fL    Platelets 207 140 - 450 10*3/mm3    Neutrophil % 64.5 42.7 - 76.0 %    Lymphocyte % 22.2 19.6 - 45.3 %    Monocyte % 10.5 5.0 - 12.0 %    Eosinophil % 2.1 0.3 - 6.2 %    Basophil % 0.7 0.0 - 1.5 %    Neutrophils, Absolute 4.50 1.70 - 7.00 10*3/mm3    Lymphocytes, Absolute 1.60 0.70 - 3.10 10*3/mm3    Monocytes, Absolute 0.70 0.10 - 0.90 10*3/mm3    Eosinophils, Absolute 0.10 0.00 - 0.40 10*3/mm3    Basophils, Absolute 0.00 0.00 - 0.20 10*3/mm3    nRBC 0.1 0.0 - 0.2 /100 WBC   Light Blue Top   Result Value Ref Range    Extra Tube hold for add-on    Green Top (Gel)   Result Value Ref Range    Extra Tube Hold for add-ons.    Lavender Top   Result Value Ref Range    Extra Tube hold for add-on     Gold Top - [facility]   Result Value Ref Range    Extra Tube Hold for add-ons.      Ct Head Without Contrast    Result Date: 4/10/2020  No acute intracranial abnormality. Specifically, no evidence of hemorrhage, mass effect or midline shift  Electronically Signed By-Wilber Walker On:4/10/2020 5:28 PM This report was finalized on [id number] by  Wilber Walker, .                                    MDM   Patient had the above evaluation.  Results were discussed with the patient.  Patient remained well-appearing in the emergency room.  Work-up is been fairly unremarkable.  Potassium was low at 3.1 and she was given oral potassium replacement.  CT head showed no acute abnormality.  Patient has a normal neurologic exam.  No obvious cause of patient's episode of confusion/amnesia is present at this time.  I discussed with the hospitalist and the patient will be admitted for further evaluation and management.      Final diagnoses:   Confusion            Govind Hutchison MD  04/10/20 5921

## 2020-04-11 ENCOUNTER — APPOINTMENT (OUTPATIENT)
Dept: CT IMAGING | Facility: HOSPITAL | Age: 70
End: 2020-04-11

## 2020-04-11 ENCOUNTER — APPOINTMENT (OUTPATIENT)
Dept: MRI IMAGING | Facility: HOSPITAL | Age: 70
End: 2020-04-11

## 2020-04-11 ENCOUNTER — APPOINTMENT (OUTPATIENT)
Dept: CARDIOLOGY | Facility: HOSPITAL | Age: 70
End: 2020-04-11

## 2020-04-11 VITALS
DIASTOLIC BLOOD PRESSURE: 77 MMHG | HEIGHT: 64 IN | WEIGHT: 175 LBS | OXYGEN SATURATION: 94 % | BODY MASS INDEX: 29.88 KG/M2 | HEART RATE: 75 BPM | RESPIRATION RATE: 16 BRPM | SYSTOLIC BLOOD PRESSURE: 117 MMHG | TEMPERATURE: 98.4 F

## 2020-04-11 LAB
ANION GAP SERPL CALCULATED.3IONS-SCNC: 13 MMOL/L (ref 5–15)
BASOPHILS # BLD AUTO: 0 10*3/MM3 (ref 0–0.2)
BASOPHILS NFR BLD AUTO: 0.7 % (ref 0–1.5)
BH CV ECHO MEAS - ACS: 2.2 CM
BH CV ECHO MEAS - AO MAX PG (FULL): 0.46 MMHG
BH CV ECHO MEAS - AO MAX PG: 4.3 MMHG
BH CV ECHO MEAS - AO MEAN PG (FULL): 0.6 MMHG
BH CV ECHO MEAS - AO MEAN PG: 2.3 MMHG
BH CV ECHO MEAS - AO ROOT AREA (BSA CORRECTED): 1.7
BH CV ECHO MEAS - AO ROOT AREA: 7.5 CM^2
BH CV ECHO MEAS - AO ROOT DIAM: 3.1 CM
BH CV ECHO MEAS - AO V2 MAX: 103.9 CM/SEC
BH CV ECHO MEAS - AO V2 MEAN: 70.7 CM/SEC
BH CV ECHO MEAS - AO V2 VTI: 22.6 CM
BH CV ECHO MEAS - AORTIC HR: 61.8 BPM
BH CV ECHO MEAS - AORTIC R-R: 0.97 SEC
BH CV ECHO MEAS - ASC AORTA: 2.9 CM
BH CV ECHO MEAS - AVA(I,A): 3.6 CM^2
BH CV ECHO MEAS - AVA(I,D): 3.6 CM^2
BH CV ECHO MEAS - AVA(V,A): 3.7 CM^2
BH CV ECHO MEAS - AVA(V,D): 3.7 CM^2
BH CV ECHO MEAS - BSA(HAYCOCK): 1.9 M^2
BH CV ECHO MEAS - BSA: 1.8 M^2
BH CV ECHO MEAS - BZI_BMI: 30 KILOGRAMS/M^2
BH CV ECHO MEAS - BZI_METRIC_HEIGHT: 162.6 CM
BH CV ECHO MEAS - BZI_METRIC_WEIGHT: 79.4 KG
BH CV ECHO MEAS - CI(AO): 5.7 L/MIN/M^2
BH CV ECHO MEAS - CI(LVOT): 2.7 L/MIN/M^2
BH CV ECHO MEAS - CO(AO): 10.5 L/MIN
BH CV ECHO MEAS - CO(LVOT): 5 L/MIN
BH CV ECHO MEAS - EDV(CUBED): 104.5 ML
BH CV ECHO MEAS - EDV(MOD-SP4): 59.6 ML
BH CV ECHO MEAS - EDV(TEICH): 102.9 ML
BH CV ECHO MEAS - EF(CUBED): 75.2 %
BH CV ECHO MEAS - EF(MOD-BP): 71 %
BH CV ECHO MEAS - EF(MOD-SP4): 71.4 %
BH CV ECHO MEAS - EF(TEICH): 67.1 %
BH CV ECHO MEAS - ESV(CUBED): 25.9 ML
BH CV ECHO MEAS - ESV(MOD-SP4): 17 ML
BH CV ECHO MEAS - ESV(TEICH): 33.8 ML
BH CV ECHO MEAS - FS: 37.2 %
BH CV ECHO MEAS - IVS/LVPW: 0.82
BH CV ECHO MEAS - IVSD: 0.66 CM
BH CV ECHO MEAS - LA DIMENSION(2D): 4.2 CM
BH CV ECHO MEAS - LV DIASTOLIC VOL/BSA (35-75): 32.3 ML/M^2
BH CV ECHO MEAS - LV MASS(C)D: 110.5 GRAMS
BH CV ECHO MEAS - LV MASS(C)DI: 59.8 GRAMS/M^2
BH CV ECHO MEAS - LV MAX PG: 3.9 MMHG
BH CV ECHO MEAS - LV MEAN PG: 1.7 MMHG
BH CV ECHO MEAS - LV SYSTOLIC VOL/BSA (12-30): 9.2 ML/M^2
BH CV ECHO MEAS - LV V1 MAX: 98.3 CM/SEC
BH CV ECHO MEAS - LV V1 MEAN: 59.2 CM/SEC
BH CV ECHO MEAS - LV V1 VTI: 20.7 CM
BH CV ECHO MEAS - LVIDD: 4.7 CM
BH CV ECHO MEAS - LVIDS: 3 CM
BH CV ECHO MEAS - LVOT AREA: 3.9 CM^2
BH CV ECHO MEAS - LVOT DIAM: 2.2 CM
BH CV ECHO MEAS - LVPWD: 0.81 CM
BH CV ECHO MEAS - MR MAX PG: 82.3 MMHG
BH CV ECHO MEAS - MR MAX VEL: 453.6 CM/SEC
BH CV ECHO MEAS - MV A MAX VEL: 96.1 CM/SEC
BH CV ECHO MEAS - MV DEC SLOPE: 484.1 CM/SEC^2
BH CV ECHO MEAS - MV DEC TIME: 0.21 SEC
BH CV ECHO MEAS - MV E MAX VEL: 101 CM/SEC
BH CV ECHO MEAS - MV E/A: 1.1
BH CV ECHO MEAS - MV MAX PG: 4.9 MMHG
BH CV ECHO MEAS - MV MEAN PG: 2.3 MMHG
BH CV ECHO MEAS - MV V2 MAX: 110.5 CM/SEC
BH CV ECHO MEAS - MV V2 MEAN: 72.7 CM/SEC
BH CV ECHO MEAS - MV V2 VTI: 29.9 CM
BH CV ECHO MEAS - MVA(VTI): 2.7 CM^2
BH CV ECHO MEAS - PA ACC TIME: 0.16 SEC
BH CV ECHO MEAS - PA MAX PG (FULL): 0.65 MMHG
BH CV ECHO MEAS - PA MAX PG: 2.9 MMHG
BH CV ECHO MEAS - PA MEAN PG (FULL): 0.38 MMHG
BH CV ECHO MEAS - PA MEAN PG: 1.5 MMHG
BH CV ECHO MEAS - PA PR(ACCEL): 7.1 MMHG
BH CV ECHO MEAS - PA V2 MAX: 84.7 CM/SEC
BH CV ECHO MEAS - PA V2 MEAN: 57.8 CM/SEC
BH CV ECHO MEAS - PA V2 VTI: 18.2 CM
BH CV ECHO MEAS - PULM A REVS DUR: 0.14 SEC
BH CV ECHO MEAS - PULM A REVS VEL: 26.5 CM/SEC
BH CV ECHO MEAS - PULM DIAS VEL: 42.8 CM/SEC
BH CV ECHO MEAS - PULM S/D: 1.1
BH CV ECHO MEAS - PULM SYS VEL: 46.2 CM/SEC
BH CV ECHO MEAS - PVA(I,A): 5.7 CM^2
BH CV ECHO MEAS - PVA(I,D): 5.7 CM^2
BH CV ECHO MEAS - PVA(V,A): 5.5 CM^2
BH CV ECHO MEAS - PVA(V,D): 5.5 CM^2
BH CV ECHO MEAS - QP/QS: 1.3
BH CV ECHO MEAS - RAP SYSTOLE: 3 MMHG
BH CV ECHO MEAS - RV MAX PG: 2.2 MMHG
BH CV ECHO MEAS - RV MEAN PG: 1.1 MMHG
BH CV ECHO MEAS - RV V1 MAX: 74.4 CM/SEC
BH CV ECHO MEAS - RV V1 MEAN: 49.2 CM/SEC
BH CV ECHO MEAS - RV V1 VTI: 16.7 CM
BH CV ECHO MEAS - RVDD: 2.4 CM
BH CV ECHO MEAS - RVOT AREA: 6.2 CM^2
BH CV ECHO MEAS - RVOT DIAM: 2.8 CM
BH CV ECHO MEAS - RVSP: 24.5 MMHG
BH CV ECHO MEAS - SI(AO): 91.6 ML/M^2
BH CV ECHO MEAS - SI(CUBED): 42.5 ML/M^2
BH CV ECHO MEAS - SI(LVOT): 44.1 ML/M^2
BH CV ECHO MEAS - SI(MOD-SP4): 23.1 ML/M^2
BH CV ECHO MEAS - SI(TEICH): 37.4 ML/M^2
BH CV ECHO MEAS - SV(AO): 169.3 ML
BH CV ECHO MEAS - SV(CUBED): 78.6 ML
BH CV ECHO MEAS - SV(LVOT): 81.6 ML
BH CV ECHO MEAS - SV(MOD-SP4): 42.6 ML
BH CV ECHO MEAS - SV(RVOT): 103.5 ML
BH CV ECHO MEAS - SV(TEICH): 69 ML
BH CV ECHO MEAS - TR MAX VEL: 231.9 CM/SEC
BUN BLD-MCNC: 14 MG/DL (ref 8–23)
BUN/CREAT SERPL: 15.6 (ref 7–25)
CALCIUM SPEC-SCNC: 9.1 MG/DL (ref 8.6–10.5)
CHLORIDE SERPL-SCNC: 102 MMOL/L (ref 98–107)
CHOLEST SERPL-MCNC: 194 MG/DL (ref 0–200)
CO2 SERPL-SCNC: 26 MMOL/L (ref 22–29)
CREAT BLD-MCNC: 0.9 MG/DL (ref 0.57–1)
DEPRECATED RDW RBC AUTO: 42.9 FL (ref 37–54)
EOSINOPHIL # BLD AUTO: 0.2 10*3/MM3 (ref 0–0.4)
EOSINOPHIL NFR BLD AUTO: 3.3 % (ref 0.3–6.2)
ERYTHROCYTE [DISTWIDTH] IN BLOOD BY AUTOMATED COUNT: 13.9 % (ref 12.3–15.4)
GFR SERPL CREATININE-BSD FRML MDRD: 62 ML/MIN/1.73
GLUCOSE BLD-MCNC: 101 MG/DL (ref 65–99)
GLUCOSE BLDC GLUCOMTR-MCNC: 94 MG/DL (ref 70–105)
GLUCOSE BLDC GLUCOMTR-MCNC: 95 MG/DL (ref 70–105)
HCT VFR BLD AUTO: 40.3 % (ref 34–46.6)
HDLC SERPL-MCNC: 41 MG/DL (ref 40–60)
HGB BLD-MCNC: 14.1 G/DL (ref 12–15.9)
LDLC SERPL CALC-MCNC: 106 MG/DL (ref 0–100)
LDLC/HDLC SERPL: 2.59 {RATIO}
LV EF 2D ECHO EST: 65 %
LYMPHOCYTES # BLD AUTO: 1.9 10*3/MM3 (ref 0.7–3.1)
LYMPHOCYTES NFR BLD AUTO: 36.5 % (ref 19.6–45.3)
MAGNESIUM SERPL-MCNC: 2 MG/DL (ref 1.6–2.4)
MCH RBC QN AUTO: 30.7 PG (ref 26.6–33)
MCHC RBC AUTO-ENTMCNC: 34.9 G/DL (ref 31.5–35.7)
MCV RBC AUTO: 87.8 FL (ref 79–97)
MONOCYTES # BLD AUTO: 0.7 10*3/MM3 (ref 0.1–0.9)
MONOCYTES NFR BLD AUTO: 12.8 % (ref 5–12)
NEUTROPHILS # BLD AUTO: 2.5 10*3/MM3 (ref 1.7–7)
NEUTROPHILS NFR BLD AUTO: 46.7 % (ref 42.7–76)
NRBC BLD AUTO-RTO: 0.2 /100 WBC (ref 0–0.2)
PHOSPHATE SERPL-MCNC: 3.1 MG/DL (ref 2.5–4.5)
PLATELET # BLD AUTO: 190 10*3/MM3 (ref 140–450)
PMV BLD AUTO: 10.4 FL (ref 6–12)
POTASSIUM BLD-SCNC: 3.7 MMOL/L (ref 3.5–5.2)
RBC # BLD AUTO: 4.59 10*6/MM3 (ref 3.77–5.28)
SODIUM BLD-SCNC: 141 MMOL/L (ref 136–145)
TRIGL SERPL-MCNC: 234 MG/DL (ref 0–150)
TSH SERPL DL<=0.05 MIU/L-ACNC: 4.2 UIU/ML (ref 0.27–4.2)
VIT B12 BLD-MCNC: 412 PG/ML (ref 211–946)
VLDLC SERPL-MCNC: 46.8 MG/DL
WBC NRBC COR # BLD: 5.3 10*3/MM3 (ref 3.4–10.8)

## 2020-04-11 PROCEDURE — 99214 OFFICE O/P EST MOD 30 MIN: CPT | Performed by: NURSE PRACTITIONER

## 2020-04-11 PROCEDURE — G0378 HOSPITAL OBSERVATION PER HR: HCPCS

## 2020-04-11 PROCEDURE — 70551 MRI BRAIN STEM W/O DYE: CPT

## 2020-04-11 PROCEDURE — 82962 GLUCOSE BLOOD TEST: CPT

## 2020-04-11 PROCEDURE — 83735 ASSAY OF MAGNESIUM: CPT | Performed by: PHYSICIAN ASSISTANT

## 2020-04-11 PROCEDURE — 80048 BASIC METABOLIC PNL TOTAL CA: CPT | Performed by: PHYSICIAN ASSISTANT

## 2020-04-11 PROCEDURE — 83036 HEMOGLOBIN GLYCOSYLATED A1C: CPT | Performed by: PHYSICIAN ASSISTANT

## 2020-04-11 PROCEDURE — 84443 ASSAY THYROID STIM HORMONE: CPT | Performed by: PHYSICIAN ASSISTANT

## 2020-04-11 PROCEDURE — 96125 COGNITIVE TEST BY HC PRO: CPT

## 2020-04-11 PROCEDURE — 99217 PR OBSERVATION CARE DISCHARGE MANAGEMENT: CPT | Performed by: HOSPITALIST

## 2020-04-11 PROCEDURE — 80061 LIPID PANEL: CPT | Performed by: PHYSICIAN ASSISTANT

## 2020-04-11 PROCEDURE — 93306 TTE W/DOPPLER COMPLETE: CPT

## 2020-04-11 PROCEDURE — 93306 TTE W/DOPPLER COMPLETE: CPT | Performed by: INTERNAL MEDICINE

## 2020-04-11 PROCEDURE — 97165 OT EVAL LOW COMPLEX 30 MIN: CPT

## 2020-04-11 PROCEDURE — 0 IOPAMIDOL PER 1 ML: Performed by: HOSPITALIST

## 2020-04-11 PROCEDURE — 85025 COMPLETE CBC W/AUTO DIFF WBC: CPT | Performed by: PHYSICIAN ASSISTANT

## 2020-04-11 PROCEDURE — 70498 CT ANGIOGRAPHY NECK: CPT

## 2020-04-11 PROCEDURE — 82607 VITAMIN B-12: CPT | Performed by: PHYSICIAN ASSISTANT

## 2020-04-11 PROCEDURE — 70496 CT ANGIOGRAPHY HEAD: CPT

## 2020-04-11 PROCEDURE — 84100 ASSAY OF PHOSPHORUS: CPT | Performed by: PHYSICIAN ASSISTANT

## 2020-04-11 RX ORDER — ASPIRIN 81 MG/1
81 TABLET, CHEWABLE ORAL DAILY
Qty: 30 TABLET | Refills: 0 | Status: SHIPPED | OUTPATIENT
Start: 2020-04-12 | End: 2022-06-04

## 2020-04-11 RX ORDER — ATORVASTATIN CALCIUM 40 MG/1
80 TABLET, FILM COATED ORAL DAILY
Status: DISCONTINUED | OUTPATIENT
Start: 2020-04-12 | End: 2020-04-11 | Stop reason: HOSPADM

## 2020-04-11 RX ORDER — ATORVASTATIN CALCIUM 40 MG/1
40 TABLET, FILM COATED ORAL DAILY
Qty: 30 TABLET | Refills: 0 | Status: SHIPPED | OUTPATIENT
Start: 2020-04-11 | End: 2020-04-11 | Stop reason: HOSPADM

## 2020-04-11 RX ORDER — ATORVASTATIN CALCIUM 40 MG/1
40 TABLET, FILM COATED ORAL DAILY
Status: DISCONTINUED | OUTPATIENT
Start: 2020-04-12 | End: 2020-04-11

## 2020-04-11 RX ADMIN — POTASSIUM CHLORIDE 40 MEQ: 1500 TABLET, EXTENDED RELEASE ORAL at 02:37

## 2020-04-11 RX ADMIN — Medication 10 ML: at 08:01

## 2020-04-11 RX ADMIN — METOPROLOL TARTRATE 25 MG: 25 TABLET, FILM COATED ORAL at 08:01

## 2020-04-11 RX ADMIN — FAMOTIDINE 20 MG: 20 TABLET ORAL at 07:53

## 2020-04-11 RX ADMIN — MELATONIN 500 UNITS: at 08:01

## 2020-04-11 RX ADMIN — VENLAFAXINE HYDROCHLORIDE 75 MG: 75 CAPSULE, EXTENDED RELEASE ORAL at 08:01

## 2020-04-11 RX ADMIN — ATORVASTATIN CALCIUM 20 MG: 20 TABLET, FILM COATED ORAL at 08:01

## 2020-04-11 RX ADMIN — AMLODIPINE BESYLATE 10 MG: 5 TABLET ORAL at 08:01

## 2020-04-11 RX ADMIN — IOPAMIDOL 100 ML: 755 INJECTION, SOLUTION INTRAVENOUS at 10:45

## 2020-04-11 RX ADMIN — POTASSIUM CHLORIDE 40 MEQ: 1500 TABLET, EXTENDED RELEASE ORAL at 06:20

## 2020-04-11 RX ADMIN — ASPIRIN 81 MG 81 MG: 81 TABLET ORAL at 08:01

## 2020-04-11 NOTE — THERAPY EVALUATION
Acute Care - Speech Language Pathology Initial Evaluation   [facility]     Patient Name: Christen Shell  : 1950  MRN: [id number]  Today's Date: 2020               Admit Date: 4/10/2020     Visit Dx:    ICD-10-CM ICD-9-CM   1. Confusion R41.0 298.9     Patient Active Problem List   Diagnosis   • Confusion   • MICKY (acute kidney injury) (CMS/HCC)   • Hypokalemia   • Hypertension   • Hyperlipidemia   • GERD (gastroesophageal reflux disease)   • Anxiety disorder   • Obesity (BMI 30-39.9)     Past Medical History:   Diagnosis Date   • Cancer (CMS/HCC)    • GERD (gastroesophageal reflux disease)    • Hyperlipidemia    • Hypertension      Past Surgical History:   Procedure Laterality Date   • HYSTERECTOMY          SLP EVALUATION (last 72 hours)      SLP SLC Evaluation     Row Name 20 1200          Document Type  evaluation  -    Subjective Information  The pt is a cooperative 70 y/o retired F who was admitted d/t acute confusion and episodic amnesia. CT head was (-), MRI & CTA are pending. Echo is planned IP, EEG planned as OP. Pt lives with spouse with children and grandchildren nearby. PPE worn: mask and gloves.     Patient Observations  alert;cooperative;agree to therapy  -    Patient Effort  excellent  -    Comment  -- Pt and Neuro PA agree in plan to d/c home. SLP is signing of  -          Cognition, Comment  The MOCA was administered this date. The pt scored 25/30 (26/30 considered WFL).  Patient was alert and orientated and able to participate fully in evaluation. She demonstrated strengths in the following assessment areas: visuospatial/executive, confrontation and divergent naming, sustained and alternating attention, expressive and receptive language. She demonstrated very slight deficit in the area of delayed recall of 5 unrelated words after 5 minutes. No concerns regarding speech, language, cognition, or dysphagia at this time.   -      User Key  (r) = Recorded By, (t) = Taken By, (c)  = Cosigned By    Initials Name Effective Dates    MICAELA Valadez Mandie 01/08/20 -              EDUCATION  The patient has been educated in the following areas:     Cognitive Impairment.    SLP Recommendation and Plan    No need for further SLP services.                                          Time Calculation:                        Mandie Valadez  4/11/2020

## 2020-04-11 NOTE — DISCHARGE SUMMARY
[facility] Medicine Services  DISCHARGE SUMMARY        Prepared For PCP:  Lake Mchugh IV, MD    Patient Name: Christen Shell  : 1950  MRN: [id number]      Date of Admission:   4/10/2020    Date of Discharge:  2020    Length of stay:  LOS: 0 days     Hospital Course     Presenting Problem:   Confusion [R41.0]      Active Hospital Problems    Diagnosis  POA   • **Confusion [R41.0]  Yes     Priority: High   • MICKY (acute kidney injury) (CMS/HCC) [N17.9]  Yes     Priority: High   • Hyperlipidemia [E78.5]  Yes     Priority: High   • Hypokalemia [E87.6]  Yes     Priority: Medium   • Anxiety disorder [F41.9]  Unknown     Priority: Medium   • GERD (gastroesophageal reflux disease) [K21.9]  Yes     Priority: Medium   • Hypertension [I10]  Yes     Priority: Medium   • Obesity (BMI 30-39.9) [E66.9]  Yes      Resolved Hospital Problems   No resolved problems to display.           Hospital Course:    The patient was placed on observation.  Neurology was consulted to evaluate the patient.  The patient mentioned episode of left hand numbness several weeks ago which likely was a TIA.  MRI of the brain was negative for infarct.  CTA head and neck was done on 2020.  TTE showed preserved EF without evidence of intracardiac thrombus.  She will be discharged home in the afternoon of 2020.  Aspirin will be added to the patient's medication regimen and is already on statin at home.  She will follow-up with her PCP for official results of CTA of head and neck.  Acute kidney injury resolved with gentle hydration.          Recommendation for Outpatient Providers:             Reasons For Change In Medications and Indications for New Medications:        Day of Discharge     HPI:       The patient is a 69 y.o. female with past medical history of anxiety, GERD, hypertension, hyperlipidemia and obesity that presented to the emergency room on 4/10/2020 for evaluation of acute episode of confusion  without associated motor or sensory deficits.        Apparently the patient was having confusion after she ate lunch on 4/10/2020 that was witnessed by her .  There were no motor or sensory deficit but she was reported to have had blank stare and was repeatedly asking where she was.  The patient denied recent infections, fever, chills, chest pain, shortness of air, nausea, vomiting, or abdominal pain.      In the ED, CT of the head without contrast was negative for acute pathology.  Laboratory work was significant for acute kidney injury likely from use of diuretics.        Vital Signs:   Temp:  [97.8 °F (36.6 °C)-98.6 °F (37 °C)] 98.4 °F (36.9 °C)  Heart Rate:  [64-88] 75  Resp:  [11-21] 16  BP: (104-145)/(74-94) 117/77     Physical Exam:  Physical Exam   Constitutional: She is oriented to person, place, and time. She appears well-developed and well-nourished.   HENT:   Head: Normocephalic and atraumatic.   Eyes: Pupils are equal, round, and reactive to light. EOM are normal.   Neck: Normal range of motion. Neck supple.   Cardiovascular: Normal rate and regular rhythm.   Pulmonary/Chest: Effort normal and breath sounds normal.   Abdominal: Soft. Bowel sounds are normal.   Musculoskeletal: Normal range of motion.   Neurological: She is alert and oriented to person, place, and time.   Skin: Skin is warm and dry.   Psychiatric: She has a normal mood and affect.       Pertinent  and/or Most Recent Results     Results from last 7 days   Lab Units 04/11/20  0509 04/10/20  2114 04/10/20  1622   WBC 10*3/mm3 5.30  --  7.00   HEMOGLOBIN g/dL 14.1  --  14.8   HEMATOCRIT % 40.3  --  41.2   PLATELETS 10*3/mm3 190  --  207   SODIUM mmol/L 141  --  139   POTASSIUM mmol/L 3.7 3.0* 3.1*   CHLORIDE mmol/L 102  --  96*   CO2 mmol/L 26.0  --  29.0   BUN mg/dL 14  --  17   CREATININE mg/dL 0.90  --  1.27*   GLUCOSE mg/dL 101*  --  116*   CALCIUM mg/dL 9.1  --  9.8     Results from last 7 days   Lab Units 04/10/20  4493    PROTIME Seconds 10.0   INR  0.95   APTT seconds 22.7*     Results from last 7 days   Lab Units 04/11/20  0509   CHOLESTEROL mg/dL 194   TRIGLYCERIDES mg/dL 234*   HDL CHOL mg/dL 41     Results from last 7 days   Lab Units 04/11/20  0509 04/10/20  1622   TSH uIU/mL 4.200  --    TROPONIN T ng/mL  --  <0.010       Brief Urine Lab Results  (Last result in the past 365 days)      Color   Clarity   Blood   Leuk Est   Nitrite   Protein   CREAT   Urine HCG        04/10/20 1638 Yellow Clear Negative Small (1+) Negative Negative               Microbiology Results Abnormal     None          Ct Head Without Contrast    Result Date: 4/10/2020  Impression: No acute intracranial abnormality. Specifically, no evidence of hemorrhage, mass effect or midline shift  Electronically Signed By-Wilber Walker On:4/10/2020 5:28 PM This report was finalized on [id number] by  Wilber Walker, .    Mri Brain Without Contrast    Result Date: 4/11/2020  Impression:  1. No acute intracranial abnormality. No acute infarct. No intracranial mass/mass effect. 2. Mild scattered periventricular and subcortical white matter high T2/FLAIR signal foci, statistically representing chronic small vessel ischemic changes. 3. Small left maxillary sinus polyp versus mucus retention cyst.  Electronically Signed ByNikki Hoffmann On:4/11/2020 10:14 AM This report was finalized on [id number] by  Marcelino Hfofmann, .                Results for orders placed during the hospital encounter of 04/10/20   Adult Transthoracic Echo Complete W/ Cont if Necessary Per Protocol    Narrative · Estimated EF = 65%.  · Left ventricular systolic function is normal.  · Left atrial cavity size is borderline dilated.  · Mild mitral valve regurgitation is present  · Mild tricuspid valve regurgitation is present.                  Test Results Pending at Discharge   Order Current Status    Hemoglobin A1c In process            Procedures Performed           Consults:   Consults     Date  and Time Order Name Status Description    4/10/2020 2024 Inpatient Neurology Consult General      4/10/2020 1742 Hospitalist (on-call MD unless specified) Completed             Discharge Details        Discharge Medications      New Medications      Instructions Start Date   aspirin 81 MG chewable tablet   81 mg, Oral, Daily   Start Date:  April 12, 2020        Continue These Medications      Instructions Start Date   amLODIPine 10 MG tablet  Commonly known as:  NORVASC   TAKE 1 TABLET BY MOUTH  DAILY      calcium polycarbophil 625 MG tablet  Commonly known as:  FIBERCON   625 mg, Oral, Daily      cholecalciferol 10 MCG (400 UNIT) tablet  Commonly known as:  VITAMIN D3   400 Units, Oral, Daily      dicyclomine 10 MG capsule  Commonly known as:  BENTYL   10 mg, Oral, Every Morning      famotidine 20 MG tablet  Commonly known as:  PEPCID   20 mg, Oral, Every Morning      metoprolol tartrate 25 MG tablet  Commonly known as:  LOPRESSOR   TAKE ONE TABLET BY MOUTH TWICE DAILY      simvastatin 40 MG tablet  Commonly known as:  ZOCOR   40 mg, Oral, Every Morning      triamterene-hydrochlorothiazide 37.5-25 MG per tablet  Commonly known as:  MAXZIDE-25   1 tablet, Oral, Daily      venlafaxine XR 75 MG 24 hr capsule  Commonly known as:  EFFEXOR-XR   75 mg, Oral, Every Morning             No Known Allergies      Discharge Disposition:  Home or Self Care    Diet:  Hospital:  Diet Order   Procedures   • Diet Regular         Discharge Activity: As tolerated        CODE STATUS:    Code Status and Medical Interventions:   Ordered at: 04/10/20 1939     Level Of Support Discussed With:    Patient     Code Status:    CPR     Medical Interventions (Level of Support Prior to Arrest):    Full         Follow-up Appointments  No future appointments.    Additional Instructions for the Follow-ups that You Need to Schedule     Discharge Follow-up with PCP   As directed       Currently Documented PCP:    Lake Mchugh IV, MD    PCP Phone  Number:    [phone]     Follow Up Details:  2 weeks                 Condition on Discharge:      Stable      Electronically signed by Gilberto Mathew DO, 04/11/20, 2:55 PM.      Time: I spent  20  minutes on this discharge activity which included face-to-face encounter with the patient/reviewing the data in the system/coordination of the care with the nursing staff as well as consultants/documentation/entering orders.

## 2020-04-11 NOTE — CONSULTS
"    Primary Care Provider: Lake Mchugh IV, MD     Consult requested by: SELENA Padilla    Reason for Consultation: Neurological evaluation, Confusion    History taken from: patient chart RN    Chief complaint: Amnesia/confusion       SUBJECTIVE:    History of present illness: Ms. Shell is a 69 y.o. female with past medical history of anxiety, GERD, hypertension, hyperlipidemia and obesity who presents to [facility] complaining of an acute episode of confusion.  Patient states she recalls a phone conversation with her daughter around 1400 hrs. in which she was experiencing no symptoms.  She states she has no recollection of events after that point for the next 2 hours while her  was outside mowing the lawn.  Her  noted that she walked onto the deck and seemed confused with a \"blank stare\" and asked him to come inside.  He contacted her daughter due to patient's unusual behavior who found patient standing in closet and reports the patient was discussing things about the closet but did not seem to be completely oriented or interacting appropriately.  Family called 911 and patient's daughter assisted patient to get ready removed her jewelry and earrings which patient does not recall.  She states the first thing she remembers after all of these events was EMS arriving and telling her that she needed to be seen at the hospital.  With the symptoms she denies any focal neurologic deficits, no falls, trauma, pain, palpitations or irregular heartbeat are reported.  She denies any fever, shortness of air, cough, nausea, vomiting or diarrhea and reports no known sick contacts.  Patient does note one episode of left-sided paresthesias described as \"pins-and-needles in my left arm and leg\" as well as a headache which lasted approximately 10 minutes 3 months prior and resolve spontaneously.  Patient did not seek medical attention at the time of this event.    Portions of the above HPI have been " copied from previous encounters and edited as appropriate.    Patient denies any history of events like this.  She denies any episodes of seizures in the past, no history of stroke.  She did have the left arm and leg numbness and tingling several months back that resolved spontaneously.   she denies any speech difficulty, vision changes, frequent headaches, changes in his sleep, drugs or alcohol use.  She feels she is completely back to her baseline at this time and is ready to be discharged.    Review of Systems   Constitutional: Negative.    Eyes: Negative for visual disturbance.   Cardiovascular: Negative.    Neurological: Negative for dizziness, tremors, seizures, syncope, facial asymmetry, speech difficulty, weakness, light-headedness, numbness and headaches.   Psychiatric/Behavioral: Positive for confusion.          PATIENT HISTORY:  Past Medical History:   Diagnosis Date   • GERD (gastroesophageal reflux disease)    • Hyperlipidemia    • Hypertension    , Past Surgical History:   Procedure Laterality Date   • HYSTERECTOMY     , Family History   Problem Relation Age of Onset   • Lung cancer Mother    • Pancreatic cancer Father    • Coronary artery disease Maternal Grandfather    , Social History     Tobacco Use   • Smoking status: Never Smoker   • Smokeless tobacco: Never Used   Substance Use Topics   • Alcohol use: Not on file   • Drug use: Never   , Medications Prior to Admission   Medication Sig Dispense Refill Last Dose   • amLODIPine (NORVASC) 10 MG tablet TAKE 1 TABLET BY MOUTH  DAILY   4/10/2020 at Unknown time   • calcium polycarbophil (FIBERCON) 625 MG tablet Take 625 mg by mouth Daily.   4/10/2020 at Unknown time   • cholecalciferol (VITAMIN D3) 10 MCG (400 UNIT) tablet Take 400 Units by mouth Daily.   4/10/2020 at Unknown time   • dicyclomine (BENTYL) 10 MG capsule Take 10 mg by mouth Every Morning.   4/10/2020 at Unknown time   • famotidine (PEPCID) 20 MG tablet Take 20 mg by mouth Every Morning.    4/10/2020 at Unknown time   • metoprolol tartrate (LOPRESSOR) 25 MG tablet TAKE ONE TABLET BY MOUTH TWICE DAILY   4/10/2020 at Unknown time   • simvastatin (ZOCOR) 40 MG tablet Take 40 mg by mouth Every Morning.      • triamterene-hydrochlorothiazide (MAXZIDE-25) 37.5-25 MG per tablet Take 1 tablet by mouth Daily.   4/10/2020 at Unknown time   • venlafaxine XR (EFFEXOR-XR) 75 MG 24 hr capsule Take 75 mg by mouth Every Morning.      , Scheduled Meds:    amLODIPine 10 mg Oral Daily   aspirin 81 mg Oral Daily   Or      aspirin 300 mg Rectal Daily   atorvastatin 20 mg Oral Daily   cholecalciferol 500 Units Oral Daily   dicyclomine 10 mg Oral Daily   enoxaparin 40 mg Subcutaneous Daily   famotidine 20 mg Oral QAM AC   metoprolol tartrate 25 mg Oral BID   polycarbophil 625 mg Oral Daily   potassium chloride 40 mEq Oral Daily   sodium chloride 10 mL Intravenous Q12H   sodium chloride 10 mL Intravenous Q12H   venlafaxine XR 75 mg Oral Daily   , Continuous Infusions:   , PRN Meds:  •  acetaminophen **OR** acetaminophen **OR** acetaminophen  •  aluminum-magnesium hydroxide-simethicone  •  Calcium Gluconate-NaCl **AND** calcium gluconate **AND** Calcium, Ionized  •  docusate sodium  •  hydrALAZINE  •  ketamine (KETALAR) infusion **AND** Ketamine Vital Signs & Assessment  •  magnesium sulfate **OR** magnesium sulfate **OR** magnesium sulfate  •  melatonin  •  nitroglycerin  •  ondansetron **OR** ondansetron  •  potassium & sodium phosphates **OR** potassium & sodium phosphates  •  potassium chloride  •  potassium chloride  •  sodium chloride  •  [COMPLETED] Insert peripheral IV **AND** sodium chloride  •  sodium chloride  •  sodium chloride, Allergies:  Patient has no known allergies.    ________________________________________________________        OBJECTIVE:    PHYSICAL EXAM:    Constitutional: The patient is in no apparent distress, bright awake and alert. There is no shortness of breath.     HEENT:  Normocephalic,  atraumatic.     Chest: Breathing unlabored    Cardiac: Regular rate and rhythm.     Extremities:  No clubbing, cyanosis or edema.    NEUROLOGICAL:    Cognition:   Fully oriented.  Fund of knowledge excellent.  Concentration and attention normal.   Language normal with normal comprehension, fluent speech, intact repetition and naming.   Short and long term memory appears intact    Cranial nerves;    II - pupils bilaterally equal reacting to light,  No new Visual field deficits;  Fundoscopic exam- Not able to be done, non-dilated exam  III,IV,VI: EOMI with no diplopia  V: Normal facial sensations  VII: No facial asymmetry,  VIII: No New hearing abnormality  IX, X, XI: normal gag and shoulder shrug;  XII: tongue is in the midline.    Sensory:  Intact to light touch in all extremities.     Motor: Strength 5/5 bilaterally upper and lower extremities. No involuntary movements present. Normal tone and bulk.    Cerebellar: Finger to nose and mirror movements normal bilaterally.    Gait and balance: Deferred.     Physical exam performed by TERELL Gonzalez.  ________________________________________________________   RESULTS REVIEW:    VITAL SIGNS:   Temp:  [97.8 °F (36.6 °C)-98.6 °F (37 °C)] 98.1 °F (36.7 °C)  Heart Rate:  [64-88] 66  Resp:  [11-21] 11  BP: (104-145)/(74-94) 135/87     LABS:  WBC   Date Value Ref Range Status   04/11/2020 5.30 3.40 - 10.80 10*3/mm3 Final     RBC   Date Value Ref Range Status   04/11/2020 4.59 3.77 - 5.28 10*6/mm3 Final     Hemoglobin   Date Value Ref Range Status   04/11/2020 14.1 12.0 - 15.9 g/dL Final     Hematocrit   Date Value Ref Range Status   04/11/2020 40.3 34.0 - 46.6 % Final     MCV   Date Value Ref Range Status   04/11/2020 87.8 79.0 - 97.0 fL Final     MCH   Date Value Ref Range Status   04/11/2020 30.7 26.6 - 33.0 pg Final     MCHC   Date Value Ref Range Status   04/11/2020 34.9 31.5 - 35.7 g/dL Final     RDW   Date Value Ref Range Status   04/11/2020 13.9 12.3 - 15.4 % Final      RDW-SD   Date Value Ref Range Status   04/11/2020 42.9 37.0 - 54.0 fl Final     MPV   Date Value Ref Range Status   04/11/2020 10.4 6.0 - 12.0 fL Final     Platelets   Date Value Ref Range Status   04/11/2020 190 140 - 450 10*3/mm3 Final     Neutrophil %   Date Value Ref Range Status   04/11/2020 46.7 42.7 - 76.0 % Final     Lymphocyte %   Date Value Ref Range Status   04/11/2020 36.5 19.6 - 45.3 % Final     Monocyte %   Date Value Ref Range Status   04/11/2020 12.8 (H) 5.0 - 12.0 % Final     Eosinophil %   Date Value Ref Range Status   04/11/2020 3.3 0.3 - 6.2 % Final     Basophil %   Date Value Ref Range Status   04/11/2020 0.7 0.0 - 1.5 % Final     Neutrophils, Absolute   Date Value Ref Range Status   04/11/2020 2.50 1.70 - 7.00 10*3/mm3 Final     Lymphocytes, Absolute   Date Value Ref Range Status   04/11/2020 1.90 0.70 - 3.10 10*3/mm3 Final     Monocytes, Absolute   Date Value Ref Range Status   04/11/2020 0.70 0.10 - 0.90 10*3/mm3 Final     Eosinophils, Absolute   Date Value Ref Range Status   04/11/2020 0.20 0.00 - 0.40 10*3/mm3 Final     Basophils, Absolute   Date Value Ref Range Status   04/11/2020 0.00 0.00 - 0.20 10*3/mm3 Final     nRBC   Date Value Ref Range Status   04/11/2020 0.2 0.0 - 0.2 /100 WBC Final     Glucose   Date Value Ref Range Status   04/11/2020 101 (H) 65 - 99 mg/dL Final     BUN   Date Value Ref Range Status   04/11/2020 14 8 - 23 mg/dL Final     Creatinine   Date Value Ref Range Status   04/11/2020 0.90 0.57 - 1.00 mg/dL Final     Sodium   Date Value Ref Range Status   04/11/2020 141 136 - 145 mmol/L Final     Potassium   Date Value Ref Range Status   04/11/2020 3.7 3.5 - 5.2 mmol/L Final     Chloride   Date Value Ref Range Status   04/11/2020 102 98 - 107 mmol/L Final     CO2   Date Value Ref Range Status   04/11/2020 26.0 22.0 - 29.0 mmol/L Final     Calcium   Date Value Ref Range Status   04/11/2020 9.1 8.6 - 10.5 mg/dL Final     eGFR Non  Amer   Date Value Ref Range  Status   04/11/2020 62 >60 mL/min/1.73 Final     BUN/Creatinine Ratio   Date Value Ref Range Status   04/11/2020 15.6 7.0 - 25.0 Final     Anion Gap   Date Value Ref Range Status   04/11/2020 13.0 5.0 - 15.0 mmol/L Final       Lab Results   Component Value Date    TSH 4.200 04/11/2020     (H) 04/11/2020         IMAGING STUDIES:  Ct Head Without Contrast    Result Date: 4/10/2020  No acute intracranial abnormality. Specifically, no evidence of hemorrhage, mass effect or midline shift  Electronically Signed By-Wilber Walker On:4/10/2020 5:28 PM This report was finalized on [id number] by  Wilber Walker, .      I reviewed the patient's new clinical results.      ________________________________________________________     PROBLEM LIST:    Confusion    MICKY (acute kidney injury) (CMS/[facility])    Hypokalemia    Hypertension    Hyperlipidemia    GERD (gastroesophageal reflux disease)    Anxiety disorder    Obesity (BMI 30-39.9)          Assessment/Plan   ASSESSMENT/PLAN:  1. Single transient episode of confusion that resolved prior to ED arrival.  Family states that she had a blank stare, was very confused and patient does not recall the event.  Differentials include TGA, seizure, metabolic encephalopathy, TIA.  MRI brain is negative for acute findings. Patient had a previous episode of left-sided numbness approximately 3 months ago that lasted only 10 minutes before completely resolving.  This most likely was a TIA- see work up below.   - CT head unremarkable  - MRI brain reviewed, no acute intracranial abnormality.  Mild scattered periventricular and subcortical white matter representing chronic small vessel ischemic changes.   - CTA head and neck today pending official read   - Echo prior to discharge (can be followed up outpatient)   - EKG: Sinus rhythm, Rate 85, W[place] interval 161  - Labs: A1C: P, B12: P, LDL: 106, TSH: 4.200  - Antithrombotics: Aspirin 81 mg daily  - Statin: Lipitor 80 mg   - Would recommend  outpatient EEG and follow-up with neurologist  - State law restrictions apply due to loss of awareness, discussed with patient, see below.    2.  Hypertension  - Continue home medications  - BP goal 130/90, avoid hypotension    3.  Hyperlipidemia  - Statin & dietary modifications    4. Recent episode left side numbness/tingling that resolved. Likely right hemispheric TIA.   - Blood pressure should be less than 130/80 outpatient, HbA1c less than 6.5, LDL less than 70; b12>500 and smoking cessation if applicable. We would be grateful if the primary team / primary care physician would keep a close watch on the above targets.  - Stroke education  - Follow up with neurologist of choice    SEIZURE/SYNCOPE INSTRUCTIONS:  -Recommended to observe all seizure precautions, including, but not limited to:   -No driving until seizure free for more than 3 months- as per State driving regulation / law;   -Avoid all high-risk activity, Take showers instead of baths, Avoid swimming without observation, Avoid open heat sources, Avoid working at heights, and Avoid engaging in all potentially hazardous activities.   -Patient expressed clear understanding.      I discussed the patient's findings and my recommendations with patient and nursing staff.     Lita Agiular, APRN  04/11/20  09:05

## 2020-04-11 NOTE — PLAN OF CARE
Problem: Patient Care Overview  Goal: Plan of Care Review  Outcome: Ongoing (interventions implemented as appropriate)  Flowsheets (Taken 4/11/2020 0621)  Progress: improving  Plan of Care Reviewed With: patient  Outcome Summary: Pt is an obs admission from ED. Will be evaluated by neurology today for acute confusion yesterday at home that had resolved at the time of arrival by EMS. Patient could potentially D/C today if she remains stable and no acute findings occur.  Goal: Individualization and Mutuality  Outcome: Ongoing (interventions implemented as appropriate)  Goal: Discharge Needs Assessment  Outcome: Ongoing (interventions implemented as appropriate)  Goal: Interprofessional Rounds/Family Conf  Outcome: Ongoing (interventions implemented as appropriate)     Problem: Stroke (Ischemic) (Adult)  Goal: Signs and Symptoms of Listed Potential Problems Will be Absent, Minimized or Managed (Stroke)  Outcome: Ongoing (interventions implemented as appropriate)  Flowsheets (Taken 4/11/2020 0621)  Problems Assessed (Stroke (Ischemic)): all  Problems Assessed (Stroke (Ischemic)): none     Problem: Confusion, Acute (Adult)  Goal: Identify Related Risk Factors and Signs and Symptoms  Outcome: Ongoing (interventions implemented as appropriate)  Flowsheets (Taken 4/11/2020 0621)  Related Risk Factors (Acute Confusion): advanced age  Signs and Symptoms (Acute Confusion): acute onset of symptoms  Goal: Cognitive/Functional Impairments Minimized  Outcome: Ongoing (interventions implemented as appropriate)  Flowsheets (Taken 4/11/2020 0621)  Cognitive/Functional Impairments Minimized: achieves outcome  Goal: Safety  Outcome: Ongoing (interventions implemented as appropriate)  Flowsheets (Taken 4/11/2020 0621)  Safety: achieves outcome

## 2020-04-11 NOTE — PLAN OF CARE
Problem: Patient Care Overview  Goal: Plan of Care Review  Outcome: Outcome(s) achieved  Note:   Plan of care reviewed. Education provided, tolerated well. Discharging home with spouse. Pt given number to schedule EEG outpatient.

## 2020-04-11 NOTE — THERAPY DISCHARGE NOTE
Acute Care - Occupational Therapy Initial Eval/Discharge   [facility]     Patient Name: Christen Shell  : 1950  MRN: [id number]  Today's Date: 2020               Admit Date: 4/10/2020       ICD-10-CM ICD-9-CM   1. Confusion R41.0 298.9     Patient Active Problem List   Diagnosis   • Confusion   • MICKY (acute kidney injury) (CMS/HCC)   • Hypokalemia   • Hypertension   • Hyperlipidemia   • GERD (gastroesophageal reflux disease)   • Anxiety disorder   • Obesity (BMI 30-39.9)     Past Medical History:   Diagnosis Date   • Cancer (CMS/HCC)    • GERD (gastroesophageal reflux disease)    • Hyperlipidemia    • Hypertension      Past Surgical History:   Procedure Laterality Date   • HYSTERECTOMY            OT ASSESSMENT FLOWSHEET (last 12 hours)      Occupational Therapy Evaluation     Row Name 20 1000                   OT Evaluation Time/Intention    Subjective Information  no complaints  -        Document Type  evaluation  -        Mode of Treatment  occupational therapy  -        Patient Effort  excellent  -        Symptoms Noted During/After Treatment  none  -        Comment  Pt is undergoing her final few tests then plans to d/c home w/ spouse. Neuro PA agrees. OT is signing off.  -           General Information    Patient Profile Reviewed?  yes  -        Patient Observations  alert;cooperative;agree to therapy  -        Patient/Family Observations  Pt reports she does not use alcohol. Has never had another episode like this one. And does not feel like she is under undue stress.  -        Prior Level of Function  independent:;community mobility;ADL's;home management;driving  -        Equipment Currently Used at Home  none  -        Pertinent History of Current Functional Problem  Pt is pleasant 68 y/o retired F who admitted after episode of acute confusion & episodic amnesia. CT head was (-), MRI & CTA are pending. Echo is planned. Differentials are atypical seizure or TIA. All  symptoms are currently resolved. Pt lives w/ spouse in home in [place] most of the time w/ chldrn & grnadchldrn nearby, but she & her spouse go to [place] home throughout the year as well.  -           Relationship/Environment    Primary Source of Support/Comfort  spouse;child(mathieu)  -        Lives With  spouse  -           Resource/Environmental Concerns    Current Living Arrangements  home/apartment/condo  -           Cognitive Assessment/Intervention- PT/OT    Orientation Status (Cognition)  oriented x 4  -        Follows Commands (Cognition)  WNL  -           Bed Mobility Assessment/Treatment    Bed Mobility Assessment/Treatment  bed mobility (all) activities  -        Tillman Level (Bed Mobility)  independent  -           Functional Mobility    Functional Mobility- Ind. Level  independent  -           Transfer Assessment/Treatment    Transfer Assessment/Treatment  sit-stand transfer;stand-sit transfer  -        Comment (Transfers)  completes more than 10 sit<>stands in 30 seconds w/o LOB, SOA, or use of armrests.  -           Sit-Stand Transfer    Sit-Stand Tillman (Transfers)  independent  -           Stand-Sit Transfer    Stand-Sit Tillman (Transfers)  independent  -           ADL Assessment/Intervention    BADL Assessment/Intervention  toileting;grooming;lower body dressing  -           Lower Body Dressing Assessment/Training    Lower Body Dressing Tillman Level  don;socks;independent  -        Lower Body Dressing Position  sitting up in bed  -           Grooming Assessment/Training    Tillman Level (Grooming)  hair care, combing/brushing;wash face, hands;oral care regimen;independent  -        Grooming Position  sink side  -           Toileting Assessment/Training    T[place]man Level (Toileting)  toileting skills;independent  -        Toileting Position  unsupported sitting  -           General ROM    GENERAL ROM COMMENTS  Full ROM throughout  -            MMT (Manual Muscle Testing)    General MMT Comments  BUE 4/5  -           Positioning and Restraints    Pre-Treatment Position  in bed  -        Post Treatment Position  bathroom  -        Bathroom  notified nsg  -           Pain Assessment    Additional Documentation  Pain Scale: Numbers Pre/Post-Treatment (Group)  -           Pain Scale: Numbers Pre/Post-Treatment    Pain Scale: Numbers, Pretreatment  0/10 - no pain  -        Pain Scale: Numbers, Post-Treatment  0/10 - no pain  -           Clinical Impression (OT)    Criteria for Skilled Therapeutic Interventions Met (OT Eval)  no problems identified which require skilled intervention  -        Therapy Frequency (OT Eval)  evaluation only  -        Anticipated Discharge Disposition (OT)  home  -          User Key  (r) = Recorded By, (t) = Taken By, (c) = Cosigned By    Initials Name Effective Dates     Seaman, Myra, OT 03/01/19 -               OT Recommendation and Plan  Outcome Summary/Treatment Plan (OT)  Anticipated Discharge Disposition (OT): home  Therapy Frequency (OT Eval): evaluation only  Plan of Care Review  Plan of Care Reviewed With: patient  Plan of Care Reviewed With: patient         Outcome Measures     Row Name 04/11/20 1100             How much help from another person do you currently need...    Turning from your back to your side while in flat bed without using bedrails?  4  -MH      Moving from lying on back to sitting on the side of a flat bed without bedrails?  4  -MH      Moving to and from a bed to a chair (including a wheelchair)?  4  -MH      Standing up from a chair using your arms (e.g., wheelchair, bedside chair)?  4  -MH      Climbing 3-5 steps with a railing?  4  -MH      To walk in hospital room?  4  -MH      AM-PAC 6 Clicks Score (PT)  24  -         Modified Mario Scale    Pre-Stroke Modified Mario Scale  0 - No Symptoms at all.  -      Modified Mario Scale  0 - No Symptoms at all.  -          Functional Assessment    Outcome Measure Options  AM-PAC 6 Clicks Basic Mobility (PT)  -        User Key  (r) = Recorded By, (t) = Taken By, (c) = Cosigned By    Initials Name Provider Type    Cece Kuo OT Occupational Therapist          Time Calculation:   Time Calculation- OT     Row Name 04/11/20 1146             Time Calculation- OT    OT Start Time  1100  -      OT Stop Time  1115  -      OT Time Calculation (min)  15 min  -      Total Timed Code Minutes- OT  0 minute(s)  -      OT Received On  04/11/20  -        User Key  (r) = Recorded By, (t) = Taken By, (c) = Cosigned By    Initials Name Provider Type     Cece Seaman OT Occupational Therapist        Therapy Suggested Charges     Code   Minutes Charges    None           Therapy Charges for Today     Code Description Service Date Service Provider Modifiers Qty    [id number]  OT EVAL LOW COMPLEXITY 3 4/11/2020 Cece Seaman OT GO 1               OT Discharge Summary  Anticipated Discharge Disposition (OT): home    Cece Seaman OT  4/11/2020

## 2020-04-11 NOTE — PLAN OF CARE
Pt is pleasant 70 y/o retired F who admitted after episode of acute confusion & episodic amnesia. CT head was (-), MRI & CTA are pending. Echo is planned IP, EEG planned as OP. Differentials include atypical seizure or TIA. All symptoms are currently resolved. Pt lives w/ spouse in home in [place] most of the time w/ chldrn & grnandchldrn nearby, but she & her spouse go to [facility] throughout the year as well. Pt uses no DME & functions (I) now & at baseline. No need for further OT services. PPE worn: mask & gloves

## 2020-04-12 NOTE — PROGRESS NOTES
Case Management Discharge Note      Final Note: Discharged home self/family care.                   Final Discharge Disposition Code: 01 - home or self-care

## 2020-04-13 LAB — HBA1C MFR BLD: 5.4 % (ref 3.5–5.6)

## 2020-04-14 ENCOUNTER — TELEPHONE (OUTPATIENT)
Dept: NEUROLOGY | Facility: CLINIC | Age: 70
End: 2020-04-14

## 2020-04-14 NOTE — TELEPHONE ENCOUNTER
Pt was in [facility] for possible TIA, they advised her to call us and get a EEG scheduled.   pls call pt to schedule [phone]   Additional Anesthesia Volume In Cc: 3

## 2020-04-14 NOTE — TELEPHONE ENCOUNTER
Please advise I would think the patient would need to be seen first before ordering a EEG please advise

## 2020-11-10 ENCOUNTER — IMPORTED ENCOUNTER (OUTPATIENT)
Dept: URBAN - METROPOLITAN AREA CLINIC 50 | Facility: CLINIC | Age: 70
End: 2020-11-10

## 2021-04-17 ASSESSMENT — TONOMETRY
OD_IOP_MMHG: 14
OD_IOP_MMHG: 13
OS_IOP_MMHG: 12
OS_IOP_MMHG: 14

## 2021-04-17 ASSESSMENT — VISUAL ACUITY
OS_SC: 20/200
OD_SC: 20/20
OS_PH: @ 18 IN
OS_SC: 20/200
OD_CC: J1@ 16 IN
OS_CC: J1@ 16 IN
OD_SC: 20/30+
OS_PH: 20/40
OS_CC: J1+
OD_CC: J1+

## 2021-11-11 ENCOUNTER — PREPPED CHART (OUTPATIENT)
Dept: URBAN - METROPOLITAN AREA CLINIC 49 | Facility: CLINIC | Age: 71
End: 2021-11-11

## 2021-11-16 ENCOUNTER — ANNUAL COMPREHENSIVE EXAM (OUTPATIENT)
Dept: URBAN - METROPOLITAN AREA CLINIC 49 | Facility: CLINIC | Age: 71
End: 2021-11-16

## 2021-11-16 DIAGNOSIS — H52.4: ICD-10-CM

## 2021-11-16 DIAGNOSIS — H16.223: ICD-10-CM

## 2021-11-16 DIAGNOSIS — H35.371: ICD-10-CM

## 2021-11-16 DIAGNOSIS — H43.813: ICD-10-CM

## 2021-11-16 PROCEDURE — 92015 DETERMINE REFRACTIVE STATE: CPT

## 2021-11-16 PROCEDURE — 92134 CPTRZ OPH DX IMG PST SGM RTA: CPT

## 2021-11-16 PROCEDURE — 92014 COMPRE OPH EXAM EST PT 1/>: CPT

## 2021-11-16 RX ORDER — LIFITEGRAST 50 MG/ML: 1 SOLUTION/ DROPS OPHTHALMIC TWICE A DAY

## 2021-11-16 ASSESSMENT — VISUAL ACUITY
OS_SC: 20/200
OU_SC: J1+(-2)
OD_SC: J5
OU_SC: 20/20
OD_SC: 20/20
OS_SC: J1

## 2021-11-16 ASSESSMENT — TONOMETRY
OS_IOP_MMHG: 14
OD_IOP_MMHG: 14

## 2022-11-29 ENCOUNTER — COMPREHENSIVE EXAM (OUTPATIENT)
Dept: URBAN - METROPOLITAN AREA CLINIC 48 | Facility: LOCATION | Age: 72
End: 2022-11-29

## 2022-11-29 DIAGNOSIS — H16.223: ICD-10-CM

## 2022-11-29 DIAGNOSIS — H43.813: ICD-10-CM

## 2022-11-29 DIAGNOSIS — H35.371: ICD-10-CM

## 2022-11-29 DIAGNOSIS — Z98.890: ICD-10-CM

## 2022-11-29 PROCEDURE — 92014 COMPRE OPH EXAM EST PT 1/>: CPT

## 2022-11-29 PROCEDURE — 92134 CPTRZ OPH DX IMG PST SGM RTA: CPT

## 2022-11-29 ASSESSMENT — VISUAL ACUITY
OS_GLARE: 20/25
OS_PH: 20/25
OU_SC: J1+
OS_GLARE: 20/25
OD_SC: 20/20

## 2022-11-29 ASSESSMENT — TONOMETRY
OD_IOP_MMHG: 13
OS_IOP_MMHG: 14

## 2023-11-30 ENCOUNTER — COMPREHENSIVE EXAM (OUTPATIENT)
Dept: URBAN - METROPOLITAN AREA CLINIC 48 | Facility: LOCATION | Age: 73
End: 2023-11-30

## 2023-11-30 DIAGNOSIS — H35.371: ICD-10-CM

## 2023-11-30 DIAGNOSIS — H04.123: ICD-10-CM

## 2023-11-30 DIAGNOSIS — H43.813: ICD-10-CM

## 2023-11-30 PROCEDURE — 92014 COMPRE OPH EXAM EST PT 1/>: CPT

## 2023-11-30 PROCEDURE — 92134 CPTRZ OPH DX IMG PST SGM RTA: CPT

## 2023-11-30 ASSESSMENT — TONOMETRY
OS_IOP_MMHG: 13
OD_IOP_MMHG: 13

## 2023-11-30 ASSESSMENT — KERATOMETRY
OS_AXISANGLE2_DEGREES: 135
OS_K2POWER_DIOPTERS: 44.00
OS_AXISANGLE_DEGREES: 045
OD_AXISANGLE_DEGREES: 015
OD_AXISANGLE2_DEGREES: 105
OS_K1POWER_DIOPTERS: 43.75
OD_K1POWER_DIOPTERS: 42.00
OD_K2POWER_DIOPTERS: 41.75

## 2023-11-30 ASSESSMENT — VISUAL ACUITY
OD_SC: 20/20
OS_SC: 20/80
OU_SC: J1+
OS_PH: 20/25-2

## 2025-02-13 ENCOUNTER — COMPREHENSIVE EXAM (OUTPATIENT)
Age: 75
End: 2025-02-13

## 2025-02-13 DIAGNOSIS — H35.371: ICD-10-CM

## 2025-02-13 DIAGNOSIS — H04.123: ICD-10-CM

## 2025-02-13 DIAGNOSIS — H43.813: ICD-10-CM

## 2025-02-13 PROCEDURE — 92134 CPTRZ OPH DX IMG PST SGM RTA: CPT

## 2025-02-13 PROCEDURE — 99213 OFFICE O/P EST LOW 20 MIN: CPT
